# Patient Record
Sex: FEMALE | Race: WHITE | NOT HISPANIC OR LATINO | URBAN - METROPOLITAN AREA
[De-identification: names, ages, dates, MRNs, and addresses within clinical notes are randomized per-mention and may not be internally consistent; named-entity substitution may affect disease eponyms.]

---

## 2017-03-24 ENCOUNTER — OUTPATIENT (OUTPATIENT)
Dept: OUTPATIENT SERVICES | Facility: HOSPITAL | Age: 32
LOS: 1 days | End: 2017-03-24
Payer: COMMERCIAL

## 2017-03-24 VITALS
WEIGHT: 145.06 LBS | DIASTOLIC BLOOD PRESSURE: 73 MMHG | OXYGEN SATURATION: 98 % | RESPIRATION RATE: 18 BRPM | HEIGHT: 68 IN | SYSTOLIC BLOOD PRESSURE: 117 MMHG | TEMPERATURE: 99 F | HEART RATE: 75 BPM

## 2017-03-24 DIAGNOSIS — R22.31 LOCALIZED SWELLING, MASS AND LUMP, RIGHT UPPER LIMB: ICD-10-CM

## 2017-03-24 DIAGNOSIS — E10.9 TYPE 1 DIABETES MELLITUS WITHOUT COMPLICATIONS: ICD-10-CM

## 2017-03-24 DIAGNOSIS — Z01.818 ENCOUNTER FOR OTHER PREPROCEDURAL EXAMINATION: ICD-10-CM

## 2017-03-24 DIAGNOSIS — D21.11 BENIGN NEOPLASM OF CONNECTIVE AND OTHER SOFT TISSUE OF RIGHT UPPER LIMB, INCLUDING SHOULDER: ICD-10-CM

## 2017-03-24 LAB — HCG UR QL: NEGATIVE — SIGNIFICANT CHANGE UP

## 2017-03-24 PROCEDURE — G0463: CPT

## 2017-03-24 PROCEDURE — 81025 URINE PREGNANCY TEST: CPT

## 2017-03-24 NOTE — H&P PST ADULT - PROBLEM SELECTOR PLAN 2
Continue Lantus and Humalog and hold insulin on day of surgery preop.		Follow-up with PCP regarding Lantus dose preop.	  Follow-up with PCP postop for diabetic management.   BS monitoring periop and cover as needed.

## 2017-03-24 NOTE — H&P PST ADULT - GASTROINTESTINAL DETAILS
nontender/normal/no distention/no rebound tenderness/no bruit/soft/no masses palpable/bowel sounds normal

## 2017-03-24 NOTE — H&P PST ADULT - RS GEN PE MLT RESP DETAILS PC
no wheezes/normal/no rales/no chest wall tenderness/no subcutaneous emphysema/no intercostal retractions/no rhonchi/respirations non-labored/good air movement/airway patent/clear to auscultation bilaterally/breath sounds equal

## 2017-03-24 NOTE — H&P PST ADULT - HISTORY OF PRESENT ILLNESS
31 yr old female with PMH of type 1 diabetes presents with c/o right forearm mass for months. Pt reports intermittent pain with palpation associated with numbness on dorsal aspect of arm. Pt for excision of right forearm mass on 3/29/2017.

## 2017-03-24 NOTE — H&P PST ADULT - ASSESSMENT
31 yr old female with PMH of type 1 diabetes presents with right forearm mass. Pt is scheduled for excision of right forearm mass on 3/29/2017. Pt is at low risk for procedure.

## 2017-03-24 NOTE — H&P PST ADULT - NEGATIVE ALLERGY TYPES
no indoor environmental allergies/no reactions to animals/no outdoor environmental allergies/no reactions to insect bites

## 2017-03-24 NOTE — H&P PST ADULT - NSANTHOSAYNRD_GEN_A_CORE
No. LOLITA screening performed.  STOP BANG Legend: 0-2 = LOW Risk; 3-4 = INTERMEDIATE Risk; 5-8 = HIGH Risk

## 2017-03-29 ENCOUNTER — OUTPATIENT (OUTPATIENT)
Dept: OUTPATIENT SERVICES | Facility: HOSPITAL | Age: 32
LOS: 1 days | Discharge: HOME | End: 2017-03-29
Payer: COMMERCIAL

## 2017-03-29 VITALS
DIASTOLIC BLOOD PRESSURE: 60 MMHG | OXYGEN SATURATION: 100 % | HEART RATE: 71 BPM | TEMPERATURE: 98 F | RESPIRATION RATE: 21 BRPM | SYSTOLIC BLOOD PRESSURE: 102 MMHG

## 2017-03-29 VITALS
RESPIRATION RATE: 14 BRPM | TEMPERATURE: 98 F | HEIGHT: 68 IN | SYSTOLIC BLOOD PRESSURE: 110 MMHG | OXYGEN SATURATION: 100 % | WEIGHT: 145.06 LBS | DIASTOLIC BLOOD PRESSURE: 80 MMHG | HEART RATE: 82 BPM

## 2017-03-29 DIAGNOSIS — R22.31 LOCALIZED SWELLING, MASS AND LUMP, RIGHT UPPER LIMB: ICD-10-CM

## 2017-03-29 DIAGNOSIS — D21.11 BENIGN NEOPLASM OF CONNECTIVE AND OTHER SOFT TISSUE OF RIGHT UPPER LIMB, INCLUDING SHOULDER: ICD-10-CM

## 2017-03-29 PROCEDURE — 88304 TISSUE EXAM BY PATHOLOGIST: CPT

## 2017-03-29 PROCEDURE — 11406 EXC TR-EXT B9+MARG >4.0 CM: CPT

## 2017-03-29 PROCEDURE — 88304 TISSUE EXAM BY PATHOLOGIST: CPT | Mod: 26

## 2017-03-29 PROCEDURE — 24071 EXC ARM/ELBOW LES SC 3 CM/>: CPT | Mod: AS

## 2017-03-29 PROCEDURE — 12032 INTMD RPR S/A/T/EXT 2.6-7.5: CPT

## 2017-03-29 RX ORDER — HYDROMORPHONE HYDROCHLORIDE 2 MG/ML
0.5 INJECTION INTRAMUSCULAR; INTRAVENOUS; SUBCUTANEOUS
Qty: 0 | Refills: 0 | Status: DISCONTINUED | OUTPATIENT
Start: 2017-03-29 | End: 2017-03-29

## 2017-03-29 RX ORDER — INSULIN GLARGINE 100 [IU]/ML
9 INJECTION, SOLUTION SUBCUTANEOUS
Qty: 0 | Refills: 0 | COMMUNITY

## 2017-03-29 RX ORDER — INSULIN LISPRO 100/ML
6 VIAL (ML) SUBCUTANEOUS
Qty: 0 | Refills: 0 | COMMUNITY

## 2017-03-29 RX ORDER — ONDANSETRON 8 MG/1
4 TABLET, FILM COATED ORAL EVERY 6 HOURS
Qty: 0 | Refills: 0 | Status: DISCONTINUED | OUTPATIENT
Start: 2017-03-29 | End: 2017-04-06

## 2017-03-29 RX ORDER — SODIUM CHLORIDE 9 MG/ML
1000 INJECTION INTRAMUSCULAR; INTRAVENOUS; SUBCUTANEOUS
Qty: 0 | Refills: 0 | Status: DISCONTINUED | OUTPATIENT
Start: 2017-03-29 | End: 2017-04-06

## 2017-03-29 RX ORDER — INSULIN LISPRO 100/ML
5 VIAL (ML) SUBCUTANEOUS
Qty: 0 | Refills: 0 | COMMUNITY

## 2017-03-29 RX ORDER — SODIUM CHLORIDE 9 MG/ML
3 INJECTION INTRAMUSCULAR; INTRAVENOUS; SUBCUTANEOUS EVERY 8 HOURS
Qty: 0 | Refills: 0 | Status: DISCONTINUED | OUTPATIENT
Start: 2017-03-29 | End: 2017-03-29

## 2017-03-29 RX ADMIN — SODIUM CHLORIDE 60 MILLILITER(S): 9 INJECTION INTRAMUSCULAR; INTRAVENOUS; SUBCUTANEOUS at 10:06

## 2017-03-29 NOTE — ASU DISCHARGE PLAN (ADULT/PEDIATRIC). - MEDICATION SUMMARY - MEDICATIONS TO TAKE
I will START or STAY ON the medications listed below when I get home from the hospital:    Vicodin 300 mg-5 mg oral tablet  -- 2 tab(s) by mouth every 4 hours, As Needed MDD:8  -- Caution federal law prohibits the transfer of this drug to any person other  than the person for whom it was prescribed.  Do not drink alcoholic beverages when taking this medication.  May cause drowsiness.  Alcohol may intensify this effect.  Use care when operating dangerous machinery.  This drug may impair the ability to drive or operate machinery.  Use care until you become familiar with its effects.  This product contains acetaminophen.  Do not use  with any other product containing acetaminophen to prevent possible liver damage.  Using more of this medication than prescribed may cause serious breathing problems.    -- Indication: For pain    Lantus 100 units/mL subcutaneous solution  -- 9 unit(s) subcutaneous once a day (at bedtime)  -- Indication: For As directed    HumaLOG 100 units/mL subcutaneous solution  -- 5 unit(s) subcutaneous once a day (with meals) with lunch  -- Indication: For As directed    HumaLOG 100 units/mL subcutaneous solution  -- 6 unit(s) subcutaneous once a day with dinner  -- Indication: For As directed

## 2017-03-29 NOTE — ASU DISCHARGE PLAN (ADULT/PEDIATRIC). - NOTIFY
Numbness, color, or temperature change to extremity/Pain not relieved by Medications/Fever greater than 101

## 2017-04-03 LAB — SURGICAL PATHOLOGY FINAL REPORT - CH: SIGNIFICANT CHANGE UP

## 2017-04-06 DIAGNOSIS — L92.9 GRANULOMATOUS DISORDER OF THE SKIN AND SUBCUTANEOUS TISSUE, UNSPECIFIED: ICD-10-CM

## 2017-04-06 DIAGNOSIS — Z79.4 LONG TERM (CURRENT) USE OF INSULIN: ICD-10-CM

## 2017-04-06 DIAGNOSIS — E11.9 TYPE 2 DIABETES MELLITUS WITHOUT COMPLICATIONS: ICD-10-CM

## 2020-11-16 NOTE — ASU DISCHARGE PLAN (ADULT/PEDIATRIC). - RETURN TO WORK
-- DO NOT REPLY / DO NOT REPLY ALL --  -- Message is from the [facility]--    COVID-19 Universal Screening: N/A - Not about scheduling    General Patient Message      Reason for Call: Patient is calling to check on the status of her paperwork for work. This is patients second time calling to check the status. Please call patient back at the number provided.    Caller Information       Type Contact Phone    11/16/2020 11:08 AM CST Phone (Incoming) Shasta Bhatti (Self) [phone] (M)          Alternative phone number: None    Turnaround time given to caller:   \"This message will be sent to [state Provider's name]. The clinical team will fulfill your request as soon as they review your message.\"     No
